# Patient Record
Sex: FEMALE | Race: WHITE
[De-identification: names, ages, dates, MRNs, and addresses within clinical notes are randomized per-mention and may not be internally consistent; named-entity substitution may affect disease eponyms.]

---

## 2019-08-28 ENCOUNTER — HOSPITAL ENCOUNTER (EMERGENCY)
Dept: HOSPITAL 36 - ER | Age: 29
Discharge: HOME | End: 2019-08-28
Payer: COMMERCIAL
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
## 2019-08-28 DIAGNOSIS — R94.31: ICD-10-CM

## 2019-08-28 DIAGNOSIS — R55: ICD-10-CM
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
## 2019-08-28 DIAGNOSIS — K52.9: ICD-10-CM

## 2019-08-28 DIAGNOSIS — F17.200: ICD-10-CM
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
## 2019-08-28 DIAGNOSIS — S09.90XA: Primary | ICD-10-CM

## 2019-08-28 LAB
ALBUMIN SERPL-MCNC: 4.2 GM/DL (ref 3.7–5.3)
ALBUMIN/GLOB SERPL: 1.4 {RATIO} (ref 1–1.8)
ALP SERPL-CCNC: 48 U/L (ref 34–104)
ALT SERPL-CCNC: 7 U/L (ref 7–52)
ANION GAP SERPL CALC-SCNC: 11.1 MMOL/L (ref 7–16)
APPEARANCE UR: CLEAR
AST SERPL-CCNC: 13 U/L (ref 13–39)
BASOPHILS # BLD AUTO: 0 TH/CUMM (ref 0–0.2)
BASOPHILS NFR BLD AUTO: 0.4 % (ref 0–2)
BILIRUB SERPL-MCNC: 0.7 MG/DL (ref 0.3–1)
BILIRUB UR-MCNC: NEGATIVE MG/DL
BUN SERPL-MCNC: 8 MG/DL (ref 7–25)
CALCIUM SERPL-MCNC: 9.4 MG/DL (ref 8.6–10.3)
CHLORIDE SERPL-SCNC: 100 MEQ/L (ref 98–107)
CO2 SERPL-SCNC: 26.6 MEQ/L (ref 21–31)
COLOR UR: YELLOW
CREAT SERPL-MCNC: 0.6 MG/DL (ref 0.6–1.2)
EOSINOPHIL # BLD AUTO: 0.2 TH/CMM (ref 0.1–0.4)
EOSINOPHIL NFR BLD AUTO: 1.9 % (ref 0–5)
ERYTHROCYTE [DISTWIDTH] IN BLOOD BY AUTOMATED COUNT: 14.6 % (ref 11.5–20)
GLOBULIN SER-MCNC: 3 GM/DL
GLUCOSE SERPL-MCNC: 106 MG/DL (ref 70–105)
GLUCOSE UR STRIP-MCNC: NEGATIVE MG/DL
HCT VFR BLD CALC: 36.3 % (ref 41–60)
HGB BLD-MCNC: 12.3 GM/DL (ref 12–16)
KETONES UR STRIP-MCNC: NEGATIVE MG/DL
LEUKOCYTE ESTERASE UR-ACNC: NEGATIVE
LYMPHOCYTE AB SER FC-ACNC: 4.7 TH/CMM (ref 1.5–3)
LYMPHOCYTES NFR BLD AUTO: 47.7 % (ref 20–50)
MCH RBC QN AUTO: 28.2 PG (ref 27–31)
MCHC RBC AUTO-ENTMCNC: 33.8 PG (ref 28–36)
MCV RBC AUTO: 83.3 FL (ref 81–100)
MICRO URNS: NO
MONOCYTES # BLD AUTO: 0.8 TH/CMM (ref 0.3–1)
MONOCYTES NFR BLD AUTO: 8.5 % (ref 2–10)
NEUTROPHILS # BLD: 4 TH/CMM (ref 1.8–8)
NEUTROPHILS NFR BLD AUTO: 41.5 % (ref 40–80)
NITRITE UR QL STRIP: NEGATIVE
PH UR STRIP: 6.5 [PH] (ref 4.6–8)
PLATELET # BLD: 258 TH/CMM (ref 150–400)
POTASSIUM SERPL-SCNC: 3.7 MEQ/L (ref 3.5–5.1)
PROT UR STRIP-MCNC: NEGATIVE MG/DL
RBC # BLD AUTO: 4.36 MIL/CMM (ref 3.8–5.1)
RBC # UR STRIP: NEGATIVE /UL
SODIUM SERPL-SCNC: 134 MEQ/L (ref 136–145)
SP GR UR STRIP: 1.01 (ref 1–1.03)
URINALYSIS COMPLETE PNL UR: (no result)
UROBILINOGEN UR STRIP-ACNC: 0.2 E.U./DL (ref 0.2–1)
WBC # BLD AUTO: 9.7 TH/CMM (ref 4.8–10.8)

## 2019-08-28 NOTE — ED PHYSICIAN CHART
ED Chief Complaint/HPI





- Patient Information


Date Seen:: 08/28/19


Time Seen:: 04:53


Chief Complaint:: Dizziness


History of Present Illness:: 


28 yo female had some watery diarrhea 3 days ago. After pt had a diarrhea early 

morning, pt felt abdominal cramp and passed out in the bath room and had an 

unwitnessed fall. Pt bumped her head with a bruise on the right forehead and a 

bruise on the right parietal area. Pt's diarrhea has resolved. Pt did not have 

any neurological deficit. Pt felt weak and tired for 1 day and was concerned 

that the fall might have caused intracranial trauma. Therefore, pt presented to 

the ED to be checked out. Pt stated that she was told to have a "heart murmur" 

5 years ago and had an echocardiogram performed but she was never told about 

the result.  


Allergies:: 


 Allergies











Allergy/AdvReac Type Severity Reaction Status Date / Time


 


No Known Allergies Allergy   Verified 08/28/19 02:23











Vitals:: 


 Vital Signs - 8 hr











  08/28/19 08/28/19 08/28/19





  02:15 03:20 04:30


 


Temp 97.7 F  


 


HR 71 62 68


 


RR 18 16 16


 


/73 118/66 116/67


 


O2 Sat % 100 100 100














ED Review of Systems





- Review of Systems


General/Constitutional: No fever, Weakness


Skin: No rash


Head: Headache


Eyes: No pain


ENT: No nasal drainage


Neck: No neck pain


Cardio Vascular: No chest pain


Pulmonary: No SOB


GI: No nausea, No vomiting


Musculoskeletal: No bone or joint pain


Neurological: No focal symptoms





ED Past Medical History





- Past Medical History


Past Medical History: No significant medical hx


Social History: Smoker (Smoke marijuana), No Alcohol, Illicit Drug Use (

Marijuana)


Surgical History: None





Family Medical History





- Family Member


  ** Mother


History Unknown: Yes





ED Physical Exam





- Physical Examination


General/Constitutional: Awake, Alert


Other Head comments:: 


Right forehead small hematoma


Eyes: PERRL, EOMI


Skin: No skin lesions


ENMT: Nasal exam nl


Neck: No nuchal rigidity


Respiratory: Clear to Auscultation, No Wheeze/Rhonchi/Rales


Cardio Vascular: RRR, No murmur, gallop, rubs, NL S1 S2


GI: No tenderness/rebounding/guarding


Extremities: normal strength in all extremities


Neuro/Psych: No focal deficits





ED Labs/Radiology/EKG Results





- Lab Results


Results: 


 Laboratory Tests











  08/28/19 08/28/19 08/28/19





  02:18 02:18 02:40


 


WBC    9.7


 


RBC    4.36


 


Hgb    12.3


 


Hct    36.3 L


 


MCV    83.3


 


MCH    28.2


 


MCHC Differential    33.8


 


RDW    14.6


 


Plt Count    258


 


MPV    8.6


 


Neutrophils %    41.5


 


Lymphocytes %    47.7


 


Monocytes %    8.5


 


Eosinophils %    1.9


 


Basophils %    0.4


 


Sodium   


 


Potassium   


 


Chloride   


 


Carbon Dioxide   


 


Anion Gap   


 


BUN   


 


Creatinine   


 


Est GFR ( Amer)   


 


Est GFR (Non-Af Amer)   


 


BUN/Creatinine Ratio   


 


Glucose   


 


Calcium   


 


Total Bilirubin   


 


AST   


 


ALT   


 


Alkaline Phosphatase   


 


Total Protein   


 


Albumin   


 


Globulin   


 


Albumin/Globulin Ratio   


 


Urine Source  CLEAN C  


 


Urine Color  YELLOW  


 


Urine Clarity  CLEAR  


 


Urine pH  6.5  


 


Ur Specific Gravity  1.015  


 


Urine Protein  NEGATIVE  


 


Urine Glucose (UA)  NEGATIVE  


 


Urine Ketones  NEGATIVE  


 


Urine Blood  NEGATIVE  


 


Urine Nitrate  NEGATIVE  


 


Urine Bilirubin  NEGATIVE  


 


Urine Urobilinogen  0.2  


 


Ur Leukocyte Esterase  NEGATIVE  


 


Urine Pregnancy Test   NEGATIVE 














  08/28/19





  02:40


 


WBC 


 


RBC 


 


Hgb 


 


Hct 


 


MCV 


 


MCH 


 


MCHC Differential 


 


RDW 


 


Plt Count 


 


MPV 


 


Neutrophils % 


 


Lymphocytes % 


 


Monocytes % 


 


Eosinophils % 


 


Basophils % 


 


Sodium  134 L


 


Potassium  3.7


 


Chloride  100


 


Carbon Dioxide  26.6


 


Anion Gap  11.1


 


BUN  8


 


Creatinine  0.6


 


Est GFR ( Amer)  > 60.0


 


Est GFR (Non-Af Amer)  > 60.0


 


BUN/Creatinine Ratio  13.3


 


Glucose  106 H


 


Calcium  9.4


 


Total Bilirubin  0.7


 


AST  13


 


ALT  7


 


Alkaline Phosphatase  48


 


Total Protein  7.2


 


Albumin  4.2


 


Globulin  3.0


 


Albumin/Globulin Ratio  1.4


 


Urine Source 


 


Urine Color 


 


Urine Clarity 


 


Urine pH 


 


Ur Specific Gravity 


 


Urine Protein 


 


Urine Glucose (UA) 


 


Urine Ketones 


 


Urine Blood 


 


Urine Nitrate 


 


Urine Bilirubin 


 


Urine Urobilinogen 


 


Ur Leukocyte Esterase 


 


Urine Pregnancy Test 














- Radiology Results


Results: 


Non-contrast CT head: no acute intracranial abnormalities





- EKG Interpretations


EKG Time:: 05:18


Rate & Rhythm: 61 bpm, sinus rhythm


Intervals: Prolonged VT interval 213





ED Assessment





- Assessment


General Assessment: 


Head trauma


Syncope due to possible vasovagal reaction


Abnormal EKG with prolonged VT interval


Gastroenteritis





Assessment/Comments:: 


CBC, CMP, UA


CT head wo contrast


EKG





ED Septic Shock





- .


Is Septic Shock (SBP<90, OR Lactate>4 mmol\L) present?: No





- <6hrs of presentation:


Vital Signs: 


 Vital Signs - 8 hr











  08/28/19 08/28/19 08/28/19





  02:15 03:20 04:30


 


Temp 97.7 F  


 


HR 71 62 68


 


RR 18 16 16


 


/73 118/66 116/67


 


O2 Sat % 100 100 100














ED Reassessment (Disposition)





- Reassessment


Reassessment Condition:: Improved





- Aftercare/Follow up Instructions


Notes:: 





Follow up PCP and cardiology for syncope and prolonged VT interval.


Return to ER if syncopal episodes recurs.  





- Patient Disposition


Discharge/Transfer:: Home

## 2019-08-28 NOTE — DIAGNOSTIC IMAGING REPORT
Head CT without intravenous contrast



Indication: Syncopal episode



Comparison: None 



Technique: Axial images were obtained from the vertex to the skull base

without IV contrast. Coronal reconstructions were made.   Total DLP:

[id number],  [id number]





FINDINGS:



Images of the brain obtained without contrast demonstrate no acute

hemorrhage. No mass lesions identified. The ventricles and basal

cisterns are patent.  The gray-white matter differentiation is

preserved. There is no mass effect or midline shift.



No skull fractures identified. No soft tissue swelling. The paranasal

sinuses are clear.



IMPRESSION:



No acute intracranial abnormality.